# Patient Record
(demographics unavailable — no encounter records)

---

## 2024-12-20 NOTE — HISTORY OF PRESENT ILLNESS
[FreeTextEntry1] : CC: 15 y old left handed teen with prenatally diagnosed Rocha syndrome, bicuspid aortic valve, mild aortic valve stenosis, mild aortic valve insufficiency, severely dilated ascending aorta, bilateral hydronephrosis, short stature, gonadal dysgenesis, anxiousness, social communication disorder, ADD, sleep difficulties, focal epilepsy, bilateral cerebral heterotopia. Here for a follow up visit  Interval history: Since last seen, Ashley has been stable. She remains seizure free since 2022, while on generic Zonisamide. She is not having side effects from the anticonvulsant. Today's routine EEG ([facility] [place]) revealed intermittent rhythmic delta slowing over both frontal regions (FIRDA). Most recent brain MRI ([facility]) revealed bilateral gray matter heterotopia. She remains under the care of a psychiatrist, who has been addressing her increasing anxiety, and ADHD related symptoms. She has been started on generic Escitalopram. The generic Propranolol has been titrated. The brand Ritalin LA has been replaced by brand Concerta.  General health has been good. She remains on hormonal replacement therapy. Sleep is good currently, but she has had sleep difficulties in the past. She currently sleeps close to 8-9 hours per night. She has a seizure detection mattress pad. She rarely wakes up during the night, to use the bathroom. She has her own room. She does not snore. Academically, she is doing well. She is still a student at "[employer]", 9th grade. She takes the subway to and from school. Classroom has a 12:2 ratio.  Current CNS medications: Generic Propranolol 80 mg BID Brand Concerta 54 mg QAM Generic Zonisamide 100 mg  mg QPM. Most recent trough level 25.8 Generic Escitalopram 20 mg QPM PRN intranasal Valtoco 10 mg as rescue for seizures over 3 minutes. Never yet needed. PRN Lorazepam 0.5-1 mg, for anxiety, as per psychiatrist  HPI: Ashley had been followed at [facility]. She has had a single witnessed seizure. The seizure occurred in 2022, out of the awake state. She had been watching TV and felt a prodrome of "head twitching", then fell. Parents assisted her once they heard "a loud noise". She was taken by ambulance to local ER, and then transferred to [facility]. Her head CT was reportedly unrevealing. An EEG was reportedly abnormal. In addition to the seizure, Ashley has a long standing history of decreased attention span, anxiousness, heart disease, and short stature. A routine EEG ( 10/2022) was normal. A routine EEG ([facility] [place] 2024) revealed rare right temporal epileptiform discharges and intermittent polymorphic slowing over both temporal regions. A routine EEG ([facility] [place] 2024) revealed intermittent polymorphic slowing over the left anterior quadrant.  Prior CNS medications: Clonidine Guanfacine Quillivant Sertraline Generic regular release Buspirone Generic Sertraline Brand Ritalin LA    history: Born at [facility], via planned C section BW 6 p 12 oz Spent 9 days at [facility] NICU for observation, in the setting of congenital cardiovascular anomaly.  Developmental history: Walked 16 mo First words 12 mo Toilet trained "on time" Received multimodal therapies via EI.  Family history: Non contributory  Social history: Lives with parents Pet cat ("Anne") Pet dog ("Scooter") Goes to school  Past medical history: Rocha syndrome Bicuspid aortic valve Aortic valve stenosis Aortic valve insufficiency Dilated ascending aorta Bilateral hydronephrosis Short stature Gonadal dysgenesis Developmental delays Anxiousness Social communication disorder ADD Sleep difficulties Focal epilepsy Bilateral heterotopias Farsightedness  Review of systems: General: No weight loss, weakness or recent fevers. Skin: No rashes, lumps, itching, color change, changes in hair/nails Head: No recent headaches, no head injury Eyes: Wears corrective eyeglasses. No discharge Ears: No changes in hearing, tinnitus, discharge Nose/Sinuses: No congestion, discharge, itching, epistaxis, snoring. Mouth/Throat: Dental braces Neck: No lumps, pain, stiffness Respiratory: No cough, SOB, hemoptysis Cardiac: No edema, chest pain, dyspnea or orthopnea GI: No constipation, bloating or diarrhea : No hematuria, dysuria, urgency or enuresis. Premenarcheal Musculoskeletal: No joint inflammation or arthralgia Neuro: Seizure. Decreased attention span. Psych: Anxiousness.  Physical Exam: Well nourished girl with peculiar phenotype, in no distress Face is symmetric Hair has normal consistency, appearance, distribution Awake, alert, great eye contact Intact speech Follows commands well Intact extraocular movements No nystagmus Normal muscle tone and bulk No focal weakness No dysmetria No ataxia No abnormal movements Gait is normal in stride, dada, and stance. Tip-toe, heel and tandem walk intact DTR deferred  Assessment: 15 y old left handed teen with prenatally diagnosed Rocha syndrome, bicuspid aortic valve, mild aortic valve stenosis, mild aortic valve insufficiency, severely dilated ascending aorta, bilateral hydronephrosis, short stature, gonadal dysgenesis, anxiousness, social communication disorder, ADD, sleep difficulties, focal epilepsy, bilateral cerebral heterotopia. Good seizure control while on current doses of generic Zonisamide.  Plan: Ashley's visit today had a duration of 40 minutes (>50% of which was spent in direct counseling and coordination of her care). I personally reviewed Ashley's medical history, medical records, test results, and then delineated next steps for her neurological care. Epilepsy is a chronic illness with potential for injury that poses a threat to life or bodily function. Ashley's dad and I reviewed cerebral heterotopia and focal epilepsies in general, different treatment modalities, co morbidities and overall prognosis. We reviewed Zonisamide and Valtoco's side effects profiles. We reviewed the seizure action plan, acute management of breakthrough seizures with rescue medications, seizure precautions, medication adherence, seizure monitoring methods/devices, common seizure triggers, and the rationale behind monitoring trough levels.  1) Continue generic Zonisamide at 100 mg  mg QPM 2) PRN intranasal Valtoco 10 mg as rescue for seizures over 3 minutes 3) Continue generic Propranolol 80 mg BID, as per psychiatrist 4) Continue generic Escitalopram 20 mg QPM, as per psychiatrist 5) Continue brand Concerta 54 mg QAM 6) Continue generic Lorazepam 0.5 mg PRN for anxiety, as per psychiatrist 7) CMP, CBC, Zonisamide trough level every 6 mo. Due next 2025 8) Continue using mattress pad seizure detection 9) Routine EEG and follow up visit by summer 2025. If non epileptiform, proceed with 24 hour ambulatory EEG to assess if slow tapering of the anticonvulsant would be appropriate  Ashley's dad understands plan, agrees and wants to move forward. All of his questions were answered. Ashley's controlled substance history was obtained from the [place] state prescription monitoring program registry. I have reviewed how many seizures Ashley had since last seen.  Eliza Esqueda MD Director Pediatric Epilepsy at [facility] Professor of Neurology and Pediatrics [facility] [employer] at [facility]  Patient asked questions/Simple: Patient demonstrates quick and easy understanding/Verbalized Understanding

## 2025-06-23 NOTE — HISTORY OF PRESENT ILLNESS
[FreeTextEntry1] : CC: 16 y old left handed teen with prenatally diagnosed Rocha syndrome, bicuspid aortic valve, mild aortic valve stenosis, mild aortic valve insufficiency, severely dilated ascending aorta, bilateral hydronephrosis, short stature, gonadal dysgenesis, anxiousness, social communication disorder, ADD, sleep difficulties, focal epilepsy, bilateral cerebral heterotopia. Here for a follow up visit  Interval history: Since last seen, Ashley has been stable. She remains free of overt and habitual seizures since 2022, while on generic Zonisamide. She is not having side effects from the anticonvulsant. She is having events of decreased relatedness, of unclear nature. Today's routine EEG ([facility]) revealed a small amount intermittent rhythmic delta slowing over both frontal regions (FIRDA). Most recent brain MRI ([facility]) revealed bilateral gray matter heterotopia. She remains under the care of a psychiatrist. The generic Escitalopram has been replaced by generic Fluoxetin. The generic Propranolol has been titrated. The brand Concerta has been titrated.  General health has been good. She remains on hormonal replacement therapy. Sleep is good currently, but she has had sleep difficulties in the past. She currently sleeps close to 8-9 hours per night. She has a seizure detection mattress pad. She rarely wakes up during the night, to use the bathroom. She has her own room. She does not snore. Academically, she is doing well. She is still a student at "[employer]", 9th grade. She takes the subway to and from school. Classroom has a 12:2 ratio.  Current CNS medications: Generic Propranolol 80 mg BID Brand Concerta 64 mg QAM Generic Zonisamide 100 mg  mg QPM. Most recent trough level 25.8 Generic Fluoxetin 20 mg QPM PRN intranasal Valtoco 10 mg as rescue for seizures over 3 minutes. Never yet needed. PRN Lorazepam 0.5-1 mg, for anxiety, as per psychiatrist  HPI: Ashley had been followed at [facility]. She has had a single witnessed seizure. The seizure occurred in 2022, out of the awake state. She had been watching TV and felt a prodrome of "head twitching", then fell. Parents assisted her once they heard "a loud noise". She was taken by ambulance to local ER, and then transferred to [facility]. Her head CT was reportedly unrevealing. An EEG was reportedly abnormal. In addition to the seizure, Ashley has a long standing history of decreased attention span, anxiousness, heart disease, and short stature. A routine EEG ( 10/2022) was normal. A routine EEG ([facility] 2024) revealed rare right temporal epileptiform discharges and intermittent polymorphic slowing over both temporal regions. A routine EEG ([facility] 2024) revealed intermittent polymorphic slowing over the left anterior quadrant.  Prior CNS medications: Clonidine Guanfacine Quillivant Sertraline Generic regular release Buspirone Generic Sertraline Brand Ritalin LA Generic Escitalopram   history: Born at , via planned C section BW 6 p 12 oz Spent 9 days at [facility] NICU for observation, in the setting of congenital cardiovascular anomaly.  Developmental history: Walked 16 mo First words 12 mo Toilet trained "on time" Received multimodal therapies via EI.  Family history: Non contributory  Social history: Lives with parents Pet cat ("Anne") Pet dog ("Scooter") Goes to school  Past medical history: Rocha syndrome Bicuspid aortic valve Aortic valve stenosis Aortic valve insufficiency Dilated ascending aorta Bilateral hydronephrosis Short stature Gonadal dysgenesis Developmental delays Anxiousness Social communication disorder ADD Sleep difficulties Focal epilepsy Bilateral heterotopias Farsightedness Paroxysmal events of unclear nature  Review of systems: General: No weight loss, weakness or recent fevers. Skin: No rashes, lumps, itching, color change, changes in hair/nails Head: No recent headaches, no head injury Eyes: Wears corrective eyeglasses. No discharge Ears: No changes in hearing, tinnitus, discharge Nose/Sinuses: No congestion, discharge, itching, epistaxis, snoring. Mouth/Throat: Dental braces Neck: No lumps, pain, stiffness Respiratory: No cough, SOB, hemoptysis Cardiac: No edema, chest pain, dyspnea or orthopnea GI: No constipation, bloating or diarrhea : No hematuria, dysuria, urgency or enuresis. Premenarcheal Musculoskeletal: No joint inflammation or arthralgia Neuro: Seizure. Decreased attention span. Paroxysmal events of unclear nature. Psych: Anxiousness.  Physical Exam: Well nourished girl with peculiar phenotype, in no distress Face is symmetric Hair has normal consistency, appearance, distribution Awake, alert, great eye contact Intact speech Follows commands well Intact extraocular movements No nystagmus Normal muscle tone and bulk No focal weakness No dysmetria No ataxia No abnormal movements Gait is normal in stride, dada, and stance. Tip-toe, heel and tandem walk intact DTR deferred  Assessment: 16 y old left handed teen with prenatally diagnosed Rocha syndrome, bicuspid aortic valve, mild aortic valve stenosis, mild aortic valve insufficiency, severely dilated ascending aorta, bilateral hydronephrosis, short stature, gonadal dysgenesis, anxiousness, social communication disorder, ADD, sleep difficulties, focal epilepsy, bilateral cerebral heterotopia. Good seizure control of habitual seizures while on current doses of generic Zonisamide. Having rare episodes of decreased relatedness, of unclear etiology.  Plan: Ashlye's visit today had a duration of 40 minutes (>50% of which was spent in direct counseling and coordination of her care). I personally reviewed Ashley's medical history, medical records, test results, and then delineated next steps for her neurological care. Epilepsy is a chronic illness with potential for injury that poses a threat to life or bodily function. Ashley's mom and I reviewed cerebral heterotopia and focal epilepsies in general, different treatment modalities, co morbidities and overall prognosis. We reviewed Zonisamide and Valtoco's side effects profiles. We reviewed the seizure action plan, acute management of breakthrough seizures with rescue medications, seizure precautions, medication adherence, seizure monitoring methods/devices, common seizure triggers, and the rationale behind monitoring trough levels.  1) Continue generic Zonisamide at 100 mg  mg QPM 2) PRN intranasal Valtoco 10 mg as rescue for seizures over 3 minutes 3) Continue generic Propranolol 80 mg BID, as per psychiatrist 4) Continue generic Fluoxetine 20 mg QPM, as per psychiatrist 5) Continue brand Concerta 63 mg QAM 6) Continue generic Lorazepam 0.5 mg PRN for anxiety, as per psychiatrist 7) CMP, CBC, Zonisamide trough level every 6 mo. Due next 2025 8) Continue using mattress pad seizure detection. Alternatively, use wearable seizure detection device 9) Monitor "staring events". If they continue to occur, proceed with inpatient video EEG tp capture and characterize targeted events of concern, for diagnostic purposes.  Ashley's mom understands plan, agrees and wants to move forward. All of her questions were answered. Ashley's controlled substance history was obtained from the [place] state prescription monitoring program registry. I have reviewed how many seizures Ashley had since last seen.  Eliza Esqueda MD Director Pediatric Epilepsy at [facility] Professor of Neurology and Pediatrics [facility] [employer] at [facility]